# Patient Record
Sex: FEMALE | Race: WHITE | NOT HISPANIC OR LATINO | Employment: OTHER | URBAN - METROPOLITAN AREA
[De-identification: names, ages, dates, MRNs, and addresses within clinical notes are randomized per-mention and may not be internally consistent; named-entity substitution may affect disease eponyms.]

---

## 2021-11-24 ENCOUNTER — PATIENT OUTREACH (OUTPATIENT)
Dept: SURGERY | Facility: CLINIC | Age: 76
End: 2021-11-24
Payer: COMMERCIAL

## 2021-11-24 ENCOUNTER — DOCUMENTATION ONLY (OUTPATIENT)
Dept: SURGERY | Facility: CLINIC | Age: 76
End: 2021-11-24
Payer: COMMERCIAL

## 2021-11-24 ENCOUNTER — TRANSCRIBE ORDERS (OUTPATIENT)
Dept: OTHER | Age: 76
End: 2021-11-24

## 2021-11-24 DIAGNOSIS — D49.0 INTRADUCTAL PAPILLARY MUCINOUS NEOPLASM: Primary | ICD-10-CM

## 2021-11-24 NOTE — PROGRESS NOTES
New Patient Oncology Nurse Navigator Note     Referring provider: Dr. Chavez     Referring Clinic/Organization: [facility]      Referred to: Hepatobiliary Surgery      Requested provider (if applicable): Dr. Dilan Gill    Referral Received: 11/24/21       Evaluation for : IPMN        Clinical History (per Nurse review of records provided):      See book marked documents:       Referring MD office note    Imaging reports  - imaging in PACs.       No results found for: , ALBUMIN, AST, ALT, ALKPHOS, BILITOTAL, LDPLT, WBC, LDRBC       Clinical Assessment / Barriers to Care (Per Nurse):    None at this time.     Records Location:     [facility] Everywhere     Records Needed:     ABDOMINAL IMAGING (CT SCANS, MRI, US)  DATING BACK TO 09/2021      Additional testing needed prior to consult:     NONE AT THIS TIME    Referral updates and Plan:       [facility] SURGERY CONSULT      Manjula Appiah, RN, BSN   Surgical Oncology New Patient Nurse Navigator  [facility]  [phone]

## 2021-11-24 NOTE — PROGRESS NOTES
Action November 24, 2021 3:14 PM ABT   Action Taken Called and spoke to Priscilla at [facility] ([facility]) to push CT and MRI til 09/21 images to  PACS    3:30 PM  Images from [facility] received and resolved to PACS

## 2021-11-29 ENCOUNTER — OFFICE VISIT (OUTPATIENT)
Dept: SURGERY | Facility: CLINIC | Age: 76
End: 2021-11-29
Attending: SURGERY
Payer: MEDICARE

## 2021-11-29 VITALS
DIASTOLIC BLOOD PRESSURE: 79 MMHG | HEART RATE: 79 BPM | WEIGHT: 196.6 LBS | SYSTOLIC BLOOD PRESSURE: 148 MMHG | OXYGEN SATURATION: 99 %

## 2021-11-29 DIAGNOSIS — D49.0 INTRADUCTAL PAPILLARY MUCINOUS NEOPLASM: ICD-10-CM

## 2021-11-29 PROCEDURE — G0009 ADMIN PNEUMOCOCCAL VACCINE: HCPCS | Performed by: SURGERY

## 2021-11-29 PROCEDURE — G0463 HOSPITAL OUTPT CLINIC VISIT: HCPCS | Mod: 25

## 2021-11-29 PROCEDURE — 250N000011 HC RX IP 250 OP 636: Performed by: SURGERY

## 2021-11-29 PROCEDURE — 250N000021 HC RX MED A9270 GY (STAT IND- M) 250: Performed by: SURGERY

## 2021-11-29 PROCEDURE — 90648 HIB PRP-T VACCINE 4 DOSE IM: CPT | Performed by: SURGERY

## 2021-11-29 PROCEDURE — 90734 MENACWYD/MENACWYCRM VACC IM: CPT | Performed by: SURGERY

## 2021-11-29 PROCEDURE — 99417 PROLNG OP E/M EACH 15 MIN: CPT | Performed by: SURGERY

## 2021-11-29 PROCEDURE — 90472 IMMUNIZATION ADMIN EACH ADD: CPT | Performed by: SURGERY

## 2021-11-29 PROCEDURE — 90732 PPSV23 VACC 2 YRS+ SUBQ/IM: CPT | Performed by: SURGERY

## 2021-11-29 PROCEDURE — 99215 OFFICE O/P EST HI 40 MIN: CPT | Performed by: SURGERY

## 2021-11-29 PROCEDURE — G0463 HOSPITAL OUTPT CLINIC VISIT: HCPCS

## 2021-11-29 RX ORDER — CHLORAL HYDRATE 500 MG
1 CAPSULE ORAL
COMMUNITY

## 2021-11-29 RX ORDER — TROSPIUM CHLORIDE ER 60 MG/1
60 CAPSULE ORAL
COMMUNITY
Start: 2021-10-14 | End: 2022-10-09

## 2021-11-29 RX ORDER — MULTIPLE VITAMINS W/ MINERALS TAB 9MG-400MCG
1 TAB ORAL DAILY
COMMUNITY

## 2021-11-29 RX ORDER — CLONIDINE HYDROCHLORIDE 0.1 MG/1
TABLET ORAL
COMMUNITY
Start: 2021-10-06

## 2021-11-29 RX ADMIN — HAEMOPHILUS B POLYSACCHARIDE CONJUGATE VACCINE FOR INJ 0.5 ML: RECON SOLN at 11:46

## 2021-11-29 RX ADMIN — PNEUMOCOCCAL VACCINE POLYVALENT 0.5 ML
25; 25; 25; 25; 25; 25; 25; 25; 25; 25; 25; 25; 25; 25; 25; 25; 25; 25; 25; 25; 25; 25; 25 INJECTION, SOLUTION INTRAMUSCULAR; SUBCUTANEOUS at 11:44

## 2021-11-29 RX ADMIN — NEISSERIA MENINGITIDIS GROUP A CAPSULAR POLYSACCHARIDE DIPHTHERIA TOXOID CONJUGATE ANTIGEN, NEISSERIA MENINGITIDIS GROUP C CAPSULAR POLYSACCHARIDE DIPHTHERIA TOXOID CONJUGATE ANTIGEN, NEISSERIA MENINGITIDIS GROUP Y CAPSULAR POLYSACCHARIDE DIPHTHERIA TOXOID CONJUGATE ANTIGEN, AND NEISSERIA MENINGITIDIS GROUP W-135 CAPSULAR POLYSACCHARIDE DIPHTHERIA TOXOID CONJUGATE ANTIGEN 0.5 ML: 4; 4; 4; 4 INJECTION, SOLUTION INTRAMUSCULAR at 11:46

## 2021-11-29 ASSESSMENT — PAIN SCALES - GENERAL: PAINLEVEL: NO PAIN (0)

## 2021-11-29 NOTE — LETTER
Date:December 28, 2021      Provider requested that no letter be sent. Do not send.       [employer]
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
## 2021-11-29 NOTE — PATIENT INSTRUCTIONS
Surgical Oncology RN Care Coordination Note:     - the surgery is a distal pancreatectomy with possible splenectomy, possible Total pancreatectomy     - splenectomy vaccines today.     - please get your covid vaccines as quickly as possible.     - Due to the current Covid 19 situation our options for surgery dates are limited. At this time our surgery schedulers will schedule surgery for the first available date and call you with the procedure date and information. If the date and time does not work for you they can work to schedule you for the next available date. This new date will likely be weeks later. These limitations are due to hospital bed availability, hospital staffing and availability in the operating room.     It may take up to 1 week for our  to contact you to finalized pre surgery appointments, surgery date and post op visit. If you do not hear from the schedule after 1 week please contact our office.     We appreciate your understanding and patience during these difficult times.       Manjula Appiah, RN, BSN  Care Coordinator

## 2021-11-29 NOTE — NURSING NOTE
Oncology Rooming Note    November 29, 2021 10:06 AM   Chela Rebollar is a 76 year old female who presents for:    Chief Complaint   Patient presents with     Oncology Clinic Visit     intraductal papillary mucinous neoplasm     Initial Vitals: BP (!) 148/79   Pulse 79   Wt 89.2 kg (196 lb 9.6 oz)   SpO2 99%  There is no height or weight on file to calculate BMI. There is no height or weight on file to calculate BSA.  No Pain (0) Comment: Data Unavailable   No LMP recorded.  Allergies reviewed: Yes  Medications reviewed: Yes    Medications: MEDICATION REFILLS NEEDED TODAY. Provider was NOT notified.  Pharmacy name entered into [facility]: [id number] - [id number] - [address] [place] [address]    Clinical concerns: none       Kait Steen CMA
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
## 2021-12-03 ENCOUNTER — HOSPITAL ENCOUNTER (INPATIENT)
Facility: CLINIC | Age: 76
Setting detail: SURGERY ADMIT
End: 2021-12-03
Attending: SURGERY | Admitting: SURGERY
Payer: MEDICARE

## 2021-12-03 ENCOUNTER — TELEPHONE (OUTPATIENT)
Dept: SURGERY | Facility: CLINIC | Age: 76
End: 2021-12-03
Payer: COMMERCIAL

## 2021-12-03 NOTE — TELEPHONE ENCOUNTER
"I called Chela to discuss H&P, COVID, post-op and surgery date of 1/5 with Dr. Gill.    She said she isn't sure if she wants surgery because she thinks \"it will destroy her immune system, especially removing the spleen\". She was stating that she wanted a later date and then that she isn't sure she wants the surgery.     I told her that I will keep the surgery on 1/5 to hold her place, and that this does NOT mean she has to have surgery on this date.     She asked me if I could pick a date towards the end of January. I went back and forth with her a few times re-explaining that I do not have a different date at the moment due to the staffing shortages etc. I explained that finding a new date would need to be discussed with Manjula first as we have multiple different factors involved. I am not sure if she fully understood.    I told her that I would message the team and have someone follow up. I told her that her concerns would be followed up on next week.  "

## 2021-12-06 ENCOUNTER — PATIENT OUTREACH (OUTPATIENT)
Dept: SURGERY | Facility: CLINIC | Age: 76
End: 2021-12-06
Payer: COMMERCIAL

## 2021-12-06 DIAGNOSIS — D49.0 INTRADUCTAL PAPILLARY MUCINOUS NEOPLASM: Primary | ICD-10-CM

## 2021-12-06 NOTE — PROGRESS NOTES
Surgical Oncology RN Care Coordination Note:     Called and spoke with patient regarding decision to cancel surgery and proceed with surveillance scan in 3 months. Offered follow up appointment with Dr. Gill and at this time she declined and wanted to plan for 3 month follow up with new scan. Informed her I will have our team reach out to schedule.     Confirmed with Dr. Gill and plan is for MRI/MRCP and visit with Dr. Gill after scan in 3 months. Orders placed and routed to scheduling to arrange.     Manjula Appiah, RN, BSN  Care Coordinator

## 2021-12-10 DIAGNOSIS — Z11.59 ENCOUNTER FOR SCREENING FOR OTHER VIRAL DISEASES: ICD-10-CM

## 2022-02-14 ENCOUNTER — PATIENT OUTREACH (OUTPATIENT)
Dept: SURGERY | Facility: CLINIC | Age: 77
End: 2022-02-14
Payer: COMMERCIAL

## 2022-02-14 NOTE — PROGRESS NOTES
Surgical Oncology RN Care Coordination Note:     Returned call to patient and left a message confirming with her that we got the message that she is not interested in surgery at this time. Informed her she is not scheduled for surgery at this time but she does have ongoing follow up for surveillance imaging. Requested she call back to discuss that she does want to keep these appointment and continue surveillance or if she was wanting to cancel all follow up. Left my direct number for call back.     Future Appointments   Date Time Provider Department Center   3/7/2022 12:00 PM [address]   3/7/2022  2:00 PM Dilan Gill MD [facility]     Manjula Appiah, RN, BSN  Care Coordinator

## 2022-02-16 ENCOUNTER — PATIENT OUTREACH (OUTPATIENT)
Dept: SURGERY | Facility: CLINIC | Age: 77
End: 2022-02-16
Payer: COMMERCIAL

## 2022-02-16 NOTE — PROGRESS NOTES
Surgical Oncology RN Care Coordination Note:     Called and confirmed with patient that she would like to cancel the appointments with Dr. Gill and the MRI. She is going to follow up locally with Dr. Chavez for ongoing follow up and surveillance.      Manjula Appiah RN, BSN  Care Coordinator